# Patient Record
Sex: MALE | Race: WHITE | Employment: UNEMPLOYED | URBAN - METROPOLITAN AREA
[De-identification: names, ages, dates, MRNs, and addresses within clinical notes are randomized per-mention and may not be internally consistent; named-entity substitution may affect disease eponyms.]

---

## 2023-07-21 ENCOUNTER — HOSPITAL ENCOUNTER (EMERGENCY)
Age: 48
Discharge: HOME | End: 2023-07-21
Payer: MEDICAID

## 2023-07-21 VITALS
OXYGEN SATURATION: 98 % | SYSTOLIC BLOOD PRESSURE: 143 MMHG | WEIGHT: 260 LBS | HEIGHT: 72 IN | BODY MASS INDEX: 35.21 KG/M2 | DIASTOLIC BLOOD PRESSURE: 91 MMHG | TEMPERATURE: 98 F | RESPIRATION RATE: 18 BRPM | HEART RATE: 67 BPM

## 2023-07-21 DIAGNOSIS — S05.01XA ABRASION OF RIGHT CORNEA, INITIAL ENCOUNTER: Primary | ICD-10-CM

## 2023-07-21 PROCEDURE — 99283 EMERGENCY DEPT VISIT LOW MDM: CPT

## 2023-07-21 RX ORDER — SULFACETAMIDE SODIUM 100 MG/ML
SOLUTION/ DROPS OPHTHALMIC
Status: DISCONTINUED
Start: 2023-07-21 | End: 2023-07-21 | Stop reason: HOSPADM

## 2023-07-21 RX ORDER — BALANCED SALT SOLUTION ENRICHED WITH BICARBONATE, DEXTROSE, AND GLUTATHIONE
KIT INTRAOCULAR
Status: DISCONTINUED
Start: 2023-07-21 | End: 2023-07-21 | Stop reason: HOSPADM

## 2023-07-21 RX ORDER — SULFACETAMIDE SODIUM 100 MG/ML
2 SOLUTION/ DROPS OPHTHALMIC
Qty: 1 EACH | Refills: 0 | Status: SHIPPED | OUTPATIENT
Start: 2023-07-21 | End: 2023-07-26

## 2023-07-21 RX ORDER — SENNA AND DOCUSATE SODIUM 50; 8.6 MG/1; MG/1
1 TABLET, FILM COATED ORAL DAILY
COMMUNITY

## 2023-07-21 ASSESSMENT — ENCOUNTER SYMPTOMS
NAUSEA: 0
ABDOMINAL PAIN: 0
EYE ITCHING: 0
PHOTOPHOBIA: 0
DIARRHEA: 0
EYE REDNESS: 0
SHORTNESS OF BREATH: 0
EYE DISCHARGE: 0
EYE PAIN: 1
CHEST TIGHTNESS: 0
VOMITING: 0

## 2023-07-21 ASSESSMENT — LIFESTYLE VARIABLES
HOW MANY STANDARD DRINKS CONTAINING ALCOHOL DO YOU HAVE ON A TYPICAL DAY: PATIENT DOES NOT DRINK
HOW OFTEN DO YOU HAVE A DRINK CONTAINING ALCOHOL: NEVER

## 2023-07-21 ASSESSMENT — PAIN SCALES - GENERAL: PAINLEVEL_OUTOF10: 7

## 2023-07-21 ASSESSMENT — PAIN DESCRIPTION - DESCRIPTORS: DESCRIPTORS: BURNING;DISCOMFORT

## 2023-07-21 ASSESSMENT — PAIN - FUNCTIONAL ASSESSMENT: PAIN_FUNCTIONAL_ASSESSMENT: 0-10

## 2023-07-21 ASSESSMENT — PAIN DESCRIPTION - LOCATION: LOCATION: EYE

## 2023-07-21 ASSESSMENT — PAIN DESCRIPTION - ORIENTATION: ORIENTATION: RIGHT

## 2023-07-21 NOTE — ED NOTES
Pt discharged in stable condition. AVS reviewed no questions at this time.      Pelon Oneill RN  07/21/23 [id number]

## 2023-07-21 NOTE — ED PROVIDER NOTES
[facility]        Pt Name: Devan Kenney  MRN: [id number]  [address] [facility] [place] 1975  Date of evaluation: 7/21/2023  Provider: Susi Welch PA-C  PCP: No primary care provider on file. Note Started: 3:35 PM EDT 7/21/23      YUSRA. I have evaluated this patient. CHIEF COMPLAINT       Chief Complaint   Patient presents with    Eye Problem     Pt cc states they have something in their right eye. Pt states it has been going on for a couple weeks, this moring they saw something in eye but is unsure what it is. HISTORY OF PRESENT ILLNESS: 1 or more Elements     History from : Patient    Limitations to history : None    Devan Kenney is a 49 y.o. male who presents to the emergency department today stating 2 weeks ago he was in a work truck that had a fan and believes something flew into his right eye. He states he has had right eye irritation since that time. He denies vision changes. He denies eye pain or redness. He denies headache, lightheadedness or dizziness. Nursing Notes were all reviewed and agreed with or any disagreements were addressed in the HPI. REVIEW OF SYSTEMS :      Review of Systems   Constitutional:  Negative for chills and fever. Eyes:  Positive for pain. Negative for photophobia, discharge, redness, itching and visual disturbance. Respiratory:  Negative for chest tightness and shortness of breath. Cardiovascular:  Negative for chest pain. Gastrointestinal:  Negative for abdominal pain, diarrhea, nausea and vomiting. Genitourinary:  Negative for dysuria. Neurological:  Negative for dizziness, light-headedness and headaches. All other systems reviewed and are negative. Positives and Pertinent negatives as per HPI.      SURGICAL HISTORY     Past Surgical History:   Procedure Laterality Date    NOSE SURGERY      TONSILLECTOMY         CURRENTMEDICATIONS       Previous Medications    DIAZEPAM Patient was given the following medications:  Medications   balanced salts plus (BSS) ophthalmic solution (has no administration in time range)   sulfacetamide (BLEPH-10) 10 % ophthalmic solution (has no administration in time range)   fluorescein 1 MG ophthalmic strip (has no administration in time range)             Is this patient to be included in the SEP-1 Core Measure due to severe sepsis or septic shock? No   Exclusion criteria - the patient is NOT to be included for SEP-1 Core Measure due to: Infection is not suspected    CONSULTS: (Who and What was discussed)  None  Discussion with Other Profesionals : None    Social Determinants : None    Records Reviewed : None    CC/HPI Summary, DDx, ED Course, and Reassessment: Patient presented to the emergency department today complaining of right eye irritation for the last 2 weeks after something blew into his right eye from a fan. He believes it might have been a piece of dirt or dust.  He denies vision changes. He denies eye pain or redness. There is no obvious foreign body on exam.  He does have a tiny corneal abrasion at the 2 o'clock position of the pupil. Negative Finn sign. Disposition Considerations (include 1 Tests not done, Shared Decision Making, Pt Expectation of Test or Tx.): Patient is provided Bleph-10. He will be referred to the [facility] with instructions to call today to schedule a follow-up visit. He is given strict return precautions. I estimate there is LOW risk for FOREIGN BODY, HYPHEMA, SUBCONJUNCTIVAL HEMORRHAGE, PERIORBITAL CELLULITIS, ORBITAL CELLULITIS, CORNEAL ULCER, PENETRATING GLOBE INJURY, CENTRAL RETINAL ARTERY OCCLUSION, ACUTE GLAUCOMA, DENDRITIC PATTERN, RETINAL VEIN THROMBOSIS, OR HERPETIC KERATITIS, thus I consider the discharge disposition reasonable.  Patient is given strict return precautions to return with worsening pain, vision changes, neck stiffness or fever, or any other

## 2023-11-29 ENCOUNTER — TELEPHONE (OUTPATIENT)
Dept: PULMONOLOGY | Age: 48
End: 2023-11-29

## 2023-11-29 NOTE — TELEPHONE ENCOUNTER
We have a referral for Abilio Garay to see Dr Myles Cohen  His voicemail is full and I am unable to leave him a message

## 2024-01-02 ENCOUNTER — OFFICE VISIT (OUTPATIENT)
Dept: SLEEP MEDICINE | Age: 49
End: 2024-01-02
Payer: MEDICAID

## 2024-01-02 VITALS
TEMPERATURE: 97.6 F | SYSTOLIC BLOOD PRESSURE: 140 MMHG | RESPIRATION RATE: 18 BRPM | BODY MASS INDEX: 35.26 KG/M2 | HEART RATE: 76 BPM | OXYGEN SATURATION: 95 % | HEIGHT: 72 IN | DIASTOLIC BLOOD PRESSURE: 90 MMHG

## 2024-01-02 DIAGNOSIS — R06.81 WITNESSED EPISODE OF APNEA: ICD-10-CM

## 2024-01-02 DIAGNOSIS — R06.89 GASPING FOR BREATH: ICD-10-CM

## 2024-01-02 DIAGNOSIS — G47.19 EXCESSIVE DAYTIME SLEEPINESS: ICD-10-CM

## 2024-01-02 DIAGNOSIS — R06.83 SNORING: ICD-10-CM

## 2024-01-02 DIAGNOSIS — G47.33 OSA (OBSTRUCTIVE SLEEP APNEA): Primary | ICD-10-CM

## 2024-01-02 PROBLEM — J34.2 DEVIATED SEPTUM: Status: ACTIVE | Noted: 2019-06-26

## 2024-01-02 PROBLEM — D75.1 ERYTHROCYTOSIS: Status: ACTIVE | Noted: 2024-01-02

## 2024-01-02 PROCEDURE — 1036F TOBACCO NON-USER: CPT | Performed by: PSYCHIATRY & NEUROLOGY

## 2024-01-02 PROCEDURE — 99204 OFFICE O/P NEW MOD 45 MIN: CPT | Performed by: PSYCHIATRY & NEUROLOGY

## 2024-01-02 PROCEDURE — G8484 FLU IMMUNIZE NO ADMIN: HCPCS | Performed by: PSYCHIATRY & NEUROLOGY

## 2024-01-02 PROCEDURE — G8417 CALC BMI ABV UP PARAM F/U: HCPCS | Performed by: PSYCHIATRY & NEUROLOGY

## 2024-01-02 PROCEDURE — G8427 DOCREV CUR MEDS BY ELIG CLIN: HCPCS | Performed by: PSYCHIATRY & NEUROLOGY

## 2024-01-02 RX ORDER — CYCLOBENZAPRINE HCL 10 MG
10 TABLET ORAL 3 TIMES DAILY PRN
COMMUNITY
Start: 2023-12-30

## 2024-01-02 ASSESSMENT — SLEEP AND FATIGUE QUESTIONNAIRES
HOW LIKELY ARE YOU TO NOD OFF OR FALL ASLEEP WHILE SITTING QUIETLY AFTER LUNCH WITHOUT ALCOHOL: 1
HOW LIKELY ARE YOU TO NOD OFF OR FALL ASLEEP WHILE WATCHING TV: 1
HOW LIKELY ARE YOU TO NOD OFF OR FALL ASLEEP WHILE SITTING INACTIVE IN A PUBLIC PLACE: 1
NECK CIRCUMFERENCE (INCHES): 18
HOW LIKELY ARE YOU TO NOD OFF OR FALL ASLEEP IN A CAR, WHILE STOPPED FOR A FEW MINUTES IN TRAFFIC: 1
HOW LIKELY ARE YOU TO NOD OFF OR FALL ASLEEP WHILE LYING DOWN TO REST IN THE AFTERNOON WHEN CIRCUMSTANCES PERMIT: 3
HOW LIKELY ARE YOU TO NOD OFF OR FALL ASLEEP WHILE SITTING AND TALKING TO SOMEONE: 1
HOW LIKELY ARE YOU TO NOD OFF OR FALL ASLEEP WHEN YOU ARE A PASSENGER IN A CAR FOR AN HOUR WITHOUT A BREAK: 1
ESS TOTAL SCORE: 10
HOW LIKELY ARE YOU TO NOD OFF OR FALL ASLEEP WHILE SITTING AND READING: 1

## 2024-01-02 ASSESSMENT — ENCOUNTER SYMPTOMS
GASTROINTESTINAL NEGATIVE: 1
BACK PAIN: 1
APNEA: 1
EYES NEGATIVE: 1
ALLERGIC/IMMUNOLOGIC NEGATIVE: 1
CHOKING: 1

## 2024-01-02 NOTE — PROGRESS NOTES
sodium (SENOKOT-S) 8.6-50 MG tablet Take 1 tablet by mouth daily   Yes Provider, MD Marcos   traMADol (ULTRAM) 50 MG tablet Take 1 tablet by mouth every 4-6 hours as needed for Pain. 1/20/20  Yes Provider, MD Marcos   diazepam (VALIUM) 10 MG tablet Take 1 tablet by mouth 2 times daily.   Yes Provider, MD Marcos   gabapentin (NEURONTIN) 600 MG tablet Take 1 tablet by mouth 3 times daily for 3 days. 1/23/20 7/21/23  Jimmy Rizo MD       Allergies as of 01/02/2024 - Fully Reviewed 01/02/2024   Allergen Reaction Noted    Penicillins  01/23/2020       Patient Active Problem List   Diagnosis    Erythrocytosis    Deviated septum       Past Medical History:   Diagnosis Date    Anxiety     Back pain        Past Surgical History:   Procedure Laterality Date    NOSE SURGERY      TONSILLECTOMY         Family History   Problem Relation Age of Onset    Diabetes Father     Diabetes Paternal Grandmother     Diabetes Paternal Grandfather     No Known Problems Mother     Prostate Cancer Neg Hx     Heart Attack Neg Hx     Stroke Neg Hx        Review of Systems   Constitutional:  Positive for fatigue and unexpected weight change.   HENT:  Positive for congestion.    Eyes: Negative.    Respiratory:  Positive for apnea and choking.    Cardiovascular: Negative.  Negative for leg swelling.   Gastrointestinal: Negative.    Endocrine: Negative.    Genitourinary:  Positive for frequency.   Musculoskeletal:  Positive for back pain.   Allergic/Immunologic: Negative.    Neurological:  Positive for numbness.   Hematological: Negative.    Psychiatric/Behavioral:  Positive for dysphoric mood. The patient is nervous/anxious.        Objective:     Vitals:  Weight BMI Neck circumference    Wt Readings from Last 3 Encounters:   07/21/23 117.9 kg (260 lb)   02/21/20 113.9 kg (251 lb)   01/23/20 104.3 kg (230 lb)    Body mass index is 35.26 kg/m². Neck circumference (Inches): 18     BP HR SaO2   BP Readings from Last 3 Encounters:  Home

## 2024-01-02 NOTE — PATIENT INSTRUCTIONS
Orders Placed This Encounter   Procedures    Baseline Diagnostic Sleep Study     Standing Status:   Future     Standing Expiration Date:   1/2/2025     Scheduling Instructions:      Please start late , he will come at 11 PM.     Order Specific Question:   Adult or Pediatric     Answer:   Adult Study (>7 Years)     Order Specific Question:   Location For Sleep Study     Answer:   [facility]     Order Specific Question:   Select Sleep Lab Location     Answer:   [facility] [place]    Sleep Study with PAP Titration     Standing Status:   Future     Standing Expiration Date:   1/2/2025     Scheduling Instructions:      Please start late , he will come at 11 PM.     Order Specific Question:   Sleep Study Titration Type     Answer:   CPAP     Order Specific Question:   Location For Sleep Study     Answer:   [facility]     Order Specific Question:   Select Sleep Lab Location     Answer:   [facility] [place]

## 2024-01-15 ENCOUNTER — HOSPITAL ENCOUNTER (OUTPATIENT)
Dept: SLEEP CENTER | Age: 49
Discharge: HOME | End: 2024-01-15
Attending: PSYCHIATRY & NEUROLOGY
Payer: MEDICAID

## 2024-01-15 DIAGNOSIS — G47.33 OSA (OBSTRUCTIVE SLEEP APNEA): ICD-10-CM

## 2024-01-15 PROCEDURE — 95810 POLYSOM 6/> YRS 4/> PARAM: CPT
